# Patient Record
Sex: MALE | Race: WHITE | NOT HISPANIC OR LATINO
[De-identification: names, ages, dates, MRNs, and addresses within clinical notes are randomized per-mention and may not be internally consistent; named-entity substitution may affect disease eponyms.]

---

## 2019-06-22 ENCOUNTER — TRANSCRIPTION ENCOUNTER (OUTPATIENT)
Age: 69
End: 2019-06-22

## 2019-08-13 ENCOUNTER — TRANSCRIPTION ENCOUNTER (OUTPATIENT)
Age: 69
End: 2019-08-13

## 2019-10-27 ENCOUNTER — TRANSCRIPTION ENCOUNTER (OUTPATIENT)
Age: 69
End: 2019-10-27

## 2020-08-18 ENCOUNTER — NON-APPOINTMENT (OUTPATIENT)
Age: 70
End: 2020-08-18

## 2020-08-18 ENCOUNTER — APPOINTMENT (OUTPATIENT)
Dept: CARDIOLOGY | Facility: CLINIC | Age: 70
End: 2020-08-18
Payer: MEDICARE

## 2020-08-18 VITALS
WEIGHT: 175 LBS | OXYGEN SATURATION: 98 % | HEART RATE: 57 BPM | HEIGHT: 72 IN | DIASTOLIC BLOOD PRESSURE: 79 MMHG | BODY MASS INDEX: 23.7 KG/M2 | SYSTOLIC BLOOD PRESSURE: 137 MMHG

## 2020-08-18 DIAGNOSIS — I48.91 UNSPECIFIED ATRIAL FIBRILLATION: ICD-10-CM

## 2020-08-18 PROCEDURE — 99204 OFFICE O/P NEW MOD 45 MIN: CPT

## 2020-08-18 PROCEDURE — 93000 ELECTROCARDIOGRAM COMPLETE: CPT

## 2020-09-16 ENCOUNTER — APPOINTMENT (OUTPATIENT)
Dept: CARDIOLOGY | Facility: CLINIC | Age: 70
End: 2020-09-16
Payer: MEDICARE

## 2020-09-16 PROCEDURE — 93306 TTE W/DOPPLER COMPLETE: CPT

## 2020-10-17 ENCOUNTER — EMERGENCY (EMERGENCY)
Facility: HOSPITAL | Age: 70
LOS: 1 days | Discharge: HOME | End: 2020-10-17
Attending: EMERGENCY MEDICINE | Admitting: EMERGENCY MEDICINE
Payer: MEDICARE

## 2020-10-17 VITALS
TEMPERATURE: 98 F | SYSTOLIC BLOOD PRESSURE: 147 MMHG | DIASTOLIC BLOOD PRESSURE: 83 MMHG | HEART RATE: 63 BPM | RESPIRATION RATE: 18 BRPM | WEIGHT: 175.05 LBS | OXYGEN SATURATION: 100 %

## 2020-10-17 VITALS
TEMPERATURE: 98 F | DIASTOLIC BLOOD PRESSURE: 78 MMHG | SYSTOLIC BLOOD PRESSURE: 138 MMHG | HEART RATE: 60 BPM | OXYGEN SATURATION: 98 % | RESPIRATION RATE: 16 BRPM

## 2020-10-17 DIAGNOSIS — Z98.890 OTHER SPECIFIED POSTPROCEDURAL STATES: Chronic | ICD-10-CM

## 2020-10-17 LAB
ALBUMIN SERPL ELPH-MCNC: 3.6 G/DL — SIGNIFICANT CHANGE UP (ref 3.3–5)
ALP SERPL-CCNC: 59 U/L — SIGNIFICANT CHANGE UP (ref 40–120)
ALT FLD-CCNC: 24 U/L — SIGNIFICANT CHANGE UP (ref 12–78)
ANION GAP SERPL CALC-SCNC: 8 MMOL/L — SIGNIFICANT CHANGE UP (ref 5–17)
APPEARANCE UR: CLEAR — SIGNIFICANT CHANGE UP
APTT BLD: 26.5 SEC — LOW (ref 27.5–35.5)
AST SERPL-CCNC: 18 U/L — SIGNIFICANT CHANGE UP (ref 15–37)
BASOPHILS # BLD AUTO: 0.05 K/UL — SIGNIFICANT CHANGE UP (ref 0–0.2)
BASOPHILS NFR BLD AUTO: 0.5 % — SIGNIFICANT CHANGE UP (ref 0–2)
BILIRUB SERPL-MCNC: 1 MG/DL — SIGNIFICANT CHANGE UP (ref 0.2–1.2)
BILIRUB UR-MCNC: NEGATIVE — SIGNIFICANT CHANGE UP
BUN SERPL-MCNC: 18 MG/DL — SIGNIFICANT CHANGE UP (ref 7–23)
CALCIUM SERPL-MCNC: 8.6 MG/DL — SIGNIFICANT CHANGE UP (ref 8.5–10.1)
CHLORIDE SERPL-SCNC: 111 MMOL/L — HIGH (ref 96–108)
CO2 SERPL-SCNC: 26 MMOL/L — SIGNIFICANT CHANGE UP (ref 22–31)
COLOR SPEC: YELLOW — SIGNIFICANT CHANGE UP
CREAT SERPL-MCNC: 1.2 MG/DL — SIGNIFICANT CHANGE UP (ref 0.5–1.3)
DIFF PNL FLD: NEGATIVE — SIGNIFICANT CHANGE UP
EOSINOPHIL # BLD AUTO: 0.06 K/UL — SIGNIFICANT CHANGE UP (ref 0–0.5)
EOSINOPHIL NFR BLD AUTO: 0.6 % — SIGNIFICANT CHANGE UP (ref 0–6)
GLUCOSE SERPL-MCNC: 84 MG/DL — SIGNIFICANT CHANGE UP (ref 70–99)
GLUCOSE UR QL: NEGATIVE — SIGNIFICANT CHANGE UP
HCT VFR BLD CALC: 44.7 % — SIGNIFICANT CHANGE UP (ref 39–50)
HGB BLD-MCNC: 15.3 G/DL — SIGNIFICANT CHANGE UP (ref 13–17)
IMM GRANULOCYTES NFR BLD AUTO: 0.4 % — SIGNIFICANT CHANGE UP (ref 0–1.5)
INR BLD: 0.97 RATIO — SIGNIFICANT CHANGE UP (ref 0.88–1.16)
KETONES UR-MCNC: NEGATIVE — SIGNIFICANT CHANGE UP
LACTATE SERPL-SCNC: 0.8 MMOL/L — SIGNIFICANT CHANGE UP (ref 0.7–2)
LACTATE SERPL-SCNC: 3.2 MMOL/L — HIGH (ref 0.7–2)
LEUKOCYTE ESTERASE UR-ACNC: NEGATIVE — SIGNIFICANT CHANGE UP
LIDOCAIN IGE QN: 130 U/L — SIGNIFICANT CHANGE UP (ref 73–393)
LYMPHOCYTES # BLD AUTO: 1.25 K/UL — SIGNIFICANT CHANGE UP (ref 1–3.3)
LYMPHOCYTES # BLD AUTO: 13.5 % — SIGNIFICANT CHANGE UP (ref 13–44)
MCHC RBC-ENTMCNC: 29.8 PG — SIGNIFICANT CHANGE UP (ref 27–34)
MCHC RBC-ENTMCNC: 34.2 GM/DL — SIGNIFICANT CHANGE UP (ref 32–36)
MCV RBC AUTO: 87 FL — SIGNIFICANT CHANGE UP (ref 80–100)
MONOCYTES # BLD AUTO: 0.52 K/UL — SIGNIFICANT CHANGE UP (ref 0–0.9)
MONOCYTES NFR BLD AUTO: 5.6 % — SIGNIFICANT CHANGE UP (ref 2–14)
NEUTROPHILS # BLD AUTO: 7.32 K/UL — SIGNIFICANT CHANGE UP (ref 1.8–7.4)
NEUTROPHILS NFR BLD AUTO: 79.4 % — HIGH (ref 43–77)
NITRITE UR-MCNC: NEGATIVE — SIGNIFICANT CHANGE UP
NRBC # BLD: 0 /100 WBCS — SIGNIFICANT CHANGE UP (ref 0–0)
PH UR: 6 — SIGNIFICANT CHANGE UP (ref 5–8)
PLATELET # BLD AUTO: 216 K/UL — SIGNIFICANT CHANGE UP (ref 150–400)
POTASSIUM SERPL-MCNC: 4 MMOL/L — SIGNIFICANT CHANGE UP (ref 3.5–5.3)
POTASSIUM SERPL-SCNC: 4 MMOL/L — SIGNIFICANT CHANGE UP (ref 3.5–5.3)
PROT SERPL-MCNC: 7.4 G/DL — SIGNIFICANT CHANGE UP (ref 6–8.3)
PROT UR-MCNC: NEGATIVE — SIGNIFICANT CHANGE UP
PROTHROM AB SERPL-ACNC: 11.4 SEC — SIGNIFICANT CHANGE UP (ref 10.6–13.6)
RBC # BLD: 5.14 M/UL — SIGNIFICANT CHANGE UP (ref 4.2–5.8)
RBC # FLD: 13.2 % — SIGNIFICANT CHANGE UP (ref 10.3–14.5)
SODIUM SERPL-SCNC: 145 MMOL/L — SIGNIFICANT CHANGE UP (ref 135–145)
SP GR SPEC: 1.01 — SIGNIFICANT CHANGE UP (ref 1.01–1.02)
TROPONIN I SERPL-MCNC: <.015 NG/ML — SIGNIFICANT CHANGE UP (ref 0.01–0.04)
UROBILINOGEN FLD QL: NEGATIVE — SIGNIFICANT CHANGE UP
WBC # BLD: 9.24 K/UL — SIGNIFICANT CHANGE UP (ref 3.8–10.5)
WBC # FLD AUTO: 9.24 K/UL — SIGNIFICANT CHANGE UP (ref 3.8–10.5)

## 2020-10-17 PROCEDURE — 71045 X-RAY EXAM CHEST 1 VIEW: CPT | Mod: 26

## 2020-10-17 PROCEDURE — 85610 PROTHROMBIN TIME: CPT

## 2020-10-17 PROCEDURE — 85730 THROMBOPLASTIN TIME PARTIAL: CPT

## 2020-10-17 PROCEDURE — 71275 CT ANGIOGRAPHY CHEST: CPT | Mod: 26

## 2020-10-17 PROCEDURE — 83605 ASSAY OF LACTIC ACID: CPT

## 2020-10-17 PROCEDURE — 86900 BLOOD TYPING SEROLOGIC ABO: CPT

## 2020-10-17 PROCEDURE — 36415 COLL VENOUS BLD VENIPUNCTURE: CPT

## 2020-10-17 PROCEDURE — 81003 URINALYSIS AUTO W/O SCOPE: CPT

## 2020-10-17 PROCEDURE — 80053 COMPREHEN METABOLIC PANEL: CPT

## 2020-10-17 PROCEDURE — 84484 ASSAY OF TROPONIN QUANT: CPT

## 2020-10-17 PROCEDURE — 86850 RBC ANTIBODY SCREEN: CPT

## 2020-10-17 PROCEDURE — 83690 ASSAY OF LIPASE: CPT

## 2020-10-17 PROCEDURE — 99285 EMERGENCY DEPT VISIT HI MDM: CPT

## 2020-10-17 PROCEDURE — 96374 THER/PROPH/DIAG INJ IV PUSH: CPT | Mod: XU

## 2020-10-17 PROCEDURE — 96375 TX/PRO/DX INJ NEW DRUG ADDON: CPT

## 2020-10-17 PROCEDURE — 96361 HYDRATE IV INFUSION ADD-ON: CPT

## 2020-10-17 PROCEDURE — 93005 ELECTROCARDIOGRAM TRACING: CPT

## 2020-10-17 PROCEDURE — 74174 CTA ABD&PLVS W/CONTRAST: CPT

## 2020-10-17 PROCEDURE — 86901 BLOOD TYPING SEROLOGIC RH(D): CPT

## 2020-10-17 PROCEDURE — 93010 ELECTROCARDIOGRAM REPORT: CPT

## 2020-10-17 PROCEDURE — 71045 X-RAY EXAM CHEST 1 VIEW: CPT

## 2020-10-17 PROCEDURE — 71275 CT ANGIOGRAPHY CHEST: CPT

## 2020-10-17 PROCEDURE — 99285 EMERGENCY DEPT VISIT HI MDM: CPT | Mod: 25

## 2020-10-17 PROCEDURE — 74174 CTA ABD&PLVS W/CONTRAST: CPT | Mod: 26

## 2020-10-17 PROCEDURE — 99284 EMERGENCY DEPT VISIT MOD MDM: CPT

## 2020-10-17 PROCEDURE — 85025 COMPLETE CBC W/AUTO DIFF WBC: CPT

## 2020-10-17 RX ORDER — SODIUM CHLORIDE 9 MG/ML
300 INJECTION INTRAMUSCULAR; INTRAVENOUS; SUBCUTANEOUS ONCE
Refills: 0 | Status: COMPLETED | OUTPATIENT
Start: 2020-10-17 | End: 2020-10-17

## 2020-10-17 RX ORDER — PANTOPRAZOLE SODIUM 20 MG/1
40 TABLET, DELAYED RELEASE ORAL ONCE
Refills: 0 | Status: COMPLETED | OUTPATIENT
Start: 2020-10-17 | End: 2020-10-17

## 2020-10-17 RX ORDER — SODIUM CHLORIDE 9 MG/ML
1000 INJECTION INTRAMUSCULAR; INTRAVENOUS; SUBCUTANEOUS ONCE
Refills: 0 | Status: COMPLETED | OUTPATIENT
Start: 2020-10-17 | End: 2020-10-17

## 2020-10-17 RX ORDER — ONDANSETRON 8 MG/1
4 TABLET, FILM COATED ORAL ONCE
Refills: 0 | Status: COMPLETED | OUTPATIENT
Start: 2020-10-17 | End: 2020-10-17

## 2020-10-17 RX ORDER — MORPHINE SULFATE 50 MG/1
4 CAPSULE, EXTENDED RELEASE ORAL ONCE
Refills: 0 | Status: DISCONTINUED | OUTPATIENT
Start: 2020-10-17 | End: 2020-10-17

## 2020-10-17 RX ADMIN — SODIUM CHLORIDE 300 MILLILITER(S): 9 INJECTION INTRAMUSCULAR; INTRAVENOUS; SUBCUTANEOUS at 13:30

## 2020-10-17 RX ADMIN — SODIUM CHLORIDE 1000 MILLILITER(S): 9 INJECTION INTRAMUSCULAR; INTRAVENOUS; SUBCUTANEOUS at 11:00

## 2020-10-17 RX ADMIN — SODIUM CHLORIDE 1000 MILLILITER(S): 9 INJECTION INTRAMUSCULAR; INTRAVENOUS; SUBCUTANEOUS at 12:30

## 2020-10-17 RX ADMIN — SODIUM CHLORIDE 300 MILLILITER(S): 9 INJECTION INTRAMUSCULAR; INTRAVENOUS; SUBCUTANEOUS at 12:33

## 2020-10-17 RX ADMIN — PANTOPRAZOLE SODIUM 40 MILLIGRAM(S): 20 TABLET, DELAYED RELEASE ORAL at 10:16

## 2020-10-17 RX ADMIN — MORPHINE SULFATE 4 MILLIGRAM(S): 50 CAPSULE, EXTENDED RELEASE ORAL at 10:36

## 2020-10-17 RX ADMIN — ONDANSETRON 4 MILLIGRAM(S): 8 TABLET, FILM COATED ORAL at 10:16

## 2020-10-17 RX ADMIN — MORPHINE SULFATE 4 MILLIGRAM(S): 50 CAPSULE, EXTENDED RELEASE ORAL at 10:16

## 2020-10-17 RX ADMIN — SODIUM CHLORIDE 1000 MILLILITER(S): 9 INJECTION INTRAMUSCULAR; INTRAVENOUS; SUBCUTANEOUS at 11:31

## 2020-10-17 RX ADMIN — SODIUM CHLORIDE 1000 MILLILITER(S): 9 INJECTION INTRAMUSCULAR; INTRAVENOUS; SUBCUTANEOUS at 10:00

## 2020-10-17 NOTE — CONSULT NOTE ADULT - ASSESSMENT
Assessment/Plan:  70 y/o M with transient Afib (not on AC) and left inguinal hernia presented to the ED c/o left groin pain which felt to him as if his inguinal hernia mesh "popped" this morning.  His pain was associated with nausea but no vomiting, lightheadedness.  States, his pain was radiating across his abdomen, chest, and back.  Denies SOB, [place], palpitations.  Denies fever, chills, sick contact , and syncope.    In the ED, he had a reduction of his left inguinal hernia and he has been asymptomatic.  His EKG showed NSR with no ischemic changes.  His CTA abdomen/pelvis negative of any acute abnormality.  However, CTA chest showed B/L lung nodules.  Troponin in ED negative.    Atypical Chest Pain  - His pain is not cardiac but a referred pain from his inguinal hernia s/p reduction with resolution of symptoms  - EKG showed NSR with no evidence of ischemia  - Troponin x 1 negative.  No need to trend  - CTA abdomen/pelvis/chest noted  - Awaiting surgical eval    Paroxysmal Afib  - Was diagnosed by his PCP and was referred to Dr. Cruz.    - TTE in 9/2020 showed normal EF with no valvular disease  - [id number] = 1, continue  mg for now as his home dose  - Continue home BB  - EKG showed NSR  - Monitor electrolytes, replete to keep K>4 and Mag>2    If no further w/u or surgical intervention needed as inpatient, can be discharged from cardiac standpoint  Otherwise, will follow as inpatient    Duyen Laird DNP, ANP-C  Cardiology  Spectra #[phone]        
All as above in PA notation.  Patient personally seen and examined.  Vitals non-suggestive.  Abdomen full/ obese, but soft and non tense and non tender.  RUQ completely WNL without guarding or rebound or referred pain.  Left groin with small to moderately sized but soft and easily reducible and seemingly fat containing recurrent inguinal hernia.  Labs reviewed and generally reassuring.  CT scan report and images personally reviewed.    Overall, clinical presentation and course most consistent with symptomatic and recurrent but non incarcerated/ non strangulated left inguinal hernia.  Patient clinically improved since arrival.  Patient surgically stable at present.  Already provided copies of results including CT to facilitate follow-up with PMD in regards to the pulmonary nodules.  Importance of this emphasized to patient and spouse in detail.  To follow-up with me in office to further discuss risks, benefits, nature of hernia repair.  No surgical contraindication to discharge.  Patient pleased, wife agrees, they are prepared to leave in good spirits.

## 2020-10-17 NOTE — ED PROVIDER NOTE - PATIENT PORTAL LINK FT
You can access the FollowMyHealth Patient Portal offered by [facility] by registering at the following website: [email or web address]. By joining [email or web address]’s FollowMyHealth portal, you will also be able to view your health information using other applications (apps) compatible with our system.

## 2020-10-17 NOTE — ED PROVIDER NOTE - CLINICAL SUMMARY MEDICAL DECISION MAKING FREE TEXT BOX
pt is a 68yo male with pmhx of hld not treated, A-fib on 81mg asa, s/p inguinal hernia repair (aprox 20 years ago) presents with abd pain x 1.5 hours. pt reports diffuse upper abd pain radiating to chest and lower abd with associated nausea without vomiting. pt had normal bms no blood noted. pt reports hernia mesh stretched causing reoccurrence of inguinal hernia. pt reports pain around hernia site. pt has been taking advil the past few days for a toothache.pt with diffuse abd pain radating to chest and back concern for ulcer/pancreaitis vs dissection. will do labs, cta chest/abd/pelvis, lactate to r/o ischemic bowel, trop, ua, ekg, cxr, ivf, pain control, pepcid, zofran and re-eval. cardio consulted will see pt in ed.

## 2020-10-17 NOTE — ED PROVIDER NOTE - CARE PROVIDER_API CALL
Malnutrition Notification Event Note Event Note Tai Baez)  Internal Medicine  [address] [place] [address]  Phone: [phone]  Fax: [phone]  Follow Up Time: 4-6 Days   Tai Baez)  Internal Medicine  [address] [place] [address]  Phone: [phone]  Fax: [phone]  Follow Up Time: 4-6 Days    Gilles Mccoy)  Internal Medicine  [address] [place] [id number]  Phone: [phone]  Fax: [phone]  Follow Up Time: 4-6 Days    Tuan Booth  SURGERY  [address] [place] [id number]  Phone: [phone]  Fax: [phone]  Follow Up Time: 1-3 Days

## 2020-10-17 NOTE — ED PROVIDER NOTE - ATTENDING CONTRIBUTION TO CARE
pt c/o 1.5 hrs of epigastric abd pain radiating to chest and shoulder and also left inguinal pain near site prior hernia repair. no fevers, chills, n/v, cough, diarrhea, urinary complaints.  wd, wn male, mild distress, pink conjunctiva, s1s2 rrr, normal pulses, lungs cta, abd soft, nontender ruq, epigastric, luq, rlq llq, + tender left inguinal hernia, reducible, no cvat

## 2020-10-17 NOTE — ED PROVIDER NOTE - CARE PLAN
Principal Discharge DX:	Abdominal pain  Secondary Diagnosis:	Chest pain   Principal Discharge DX:	Abdominal pain  Secondary Diagnosis:	Chest pain  Secondary Diagnosis:	Lung nodule

## 2020-10-17 NOTE — ED PROVIDER NOTE - PROVIDER TOKENS
PROVIDER:[TOKEN:[[id number]],FOLLOWUP:[4-6 Days]] PROVIDER:[TOKEN:[[id number]],FOLLOWUP:[4-6 Days]],PROVIDER:[TOKEN:[[id number]],FOLLOWUP:[4-6 Days]],PROVIDER:[TOKEN:[[id number]],FOLLOWUP:[1-3 Days]]

## 2020-10-17 NOTE — ED ADULT NURSE NOTE - NSIMPLEMENTINTERV_GEN_ALL_ED
Implemented All Universal Safety Interventions:  [place] to call system. Call bell, personal items and telephone within reach. Instruct patient to call for assistance. Room bathroom lighting operational. Non-slip footwear when patient is off stretcher. Physically safe environment: no spills, clutter or unnecessary equipment. Stretcher in lowest position, wheels locked, appropriate side rails in place.

## 2020-10-17 NOTE — CONSULT NOTE ADULT - SUBJECTIVE AND OBJECTIVE BOX
SURGERY PA CONSULT NOTE:    CHIEF COMPLAINT:  Patient is a 69y old  Male who presents with a chief complaint of left inguinal pain since this morning     PCP:   Dr. Gilles Mccoy   CARDIOLOGIST:  Dr. Cruz     HPI:  This is a pleasant, 70 yo male with pmhx of Afib, HLD, and melanoma (s/p excision), who presented to the ED earlier today with complaints of sudden onset pain in the left groin, with associated nausea.  He reports the pain has essentially resolved at this time, but has residual soreness in the left groin.  At onset approx. 0815 hours, pain was sharp, severe, constant, radiated to the upper abdomen and chest, and rated 10/10, causing him to double over.  He reports eating breakfast (cereal with fruit) about 15 minutes prior to pain onset.  Nausea began about an hour after the pain, and was associated with an episode of retching without andrew vomiting.  Patient has h/o open left IH repair with mesh about 30y ago (with Dr. Rico in [place]) - always had a slight bulge post-operatively, but was told it was normal stretching of the mesh - never had any significant complications or pain with regard to the hernia until this morning.  Denies fevers, chills, sweats, SOB, dyspnea, dizziness, palpitations.  Denies any change in bowel or bladder habits - last BM this morning (normal, non-bloody), +passing flatus, + urinating.  Denies recent sick contacts, did return from [place] yesterday.      PAST MEDICAL HISTORY:  Afib  Hyperlipidemia    PAST SURGICAL HISTORY:  Prior left inguinal hernia repair with mesh  Melanoma excision from abdomen  Hemorrhoid surgery   Left shoulder RCR  Right ring finger ORIF  Vein excision left leg    REVIEW OF SYSTEMS:  General: No acute distress, awake and alert  Head: Normal cephalic/atraumatic  Neck: Denies neck pain or palpable masses, hoarseness or stridor  Lymphatic: Denies cervical or axillary or femoral lymphadenopathy  Chest: No chest pain, cough or hemoptysis  Heart: No chest pain, palpitations  Abdomen: No abdominal distention or vomiting, presented with left groin pain (since resolved)  Extremities: Denies cyanosis, open sores or swollen extremity  Neuro: Denies weakness, paralysis, syncope, loss of vision  Psych: Denies hallucinations, visual disturbances, or depression    MEDICATIONS:  81mg ASA (2 pills QAM)  Centrum MVI    ALLERGIES:  penicillin (Hives)    SOCIAL HISTORY:  Former smoker (2/3 ppd x 25y), quit 20y ago  Occasional ETOH (wine)  Denies illicit drugs   with 3 grown children       FAMILY HISTORY:  Non-contributory    VITALS:  T(C): 36.7 (17 Oct 2020 12:22), Max: 36.7 (17 Oct 2020 12:22)  T(F): 98 (17 Oct 2020 12:22), Max: 98 (17 Oct 2020 12:22)  HR: 62 (17 Oct 2020 12:22) (62 - 63)  BP: 142/80 (17 Oct 2020 12:22) (140/78 - 147/83)  BP(mean): --  RR: 16 (17 Oct 2020 12:22) (16 - 18)  SpO2: 99% (17 Oct 2020 12:22) (99% - 100%)    PHYSICAL EXAM:  General:  Appears stated age, well-groomed, well-nourished, no distress  HENT:  WNL, no JVD  Chest:  Clear to auscultation bilaterally without W/R/R  Cardiovascular:  Regular rate & rhythm, S1S2  Abdomen:  Soft, NT, ND.  No rebound/guarding.  +BS x 4.  No appreciable deformities/masses/hernias in the groin/inguinal region bilaterally.  No appreciable LAD bilat inguinal region.  Old, healed herniorrhaphy incision left groin noted.  Old, healed incision from melanoma excision noted at mid-abdomen.    Extremities:  Calves soft, NT bilat without edema  Skin:  No rash  Musculoskeletal:  Normal strength  Neuro/Psych:  Alert, oriented to time, place, and person     LABS:                        15.3   9.24  )-----------( 216      ( 17 Oct 2020 10:25 )             44.7     10-17    145  |  111<H>  |  18  ----------------------------<  84  4.0   |  26  |  1.20    Ca    8.6      17 Oct 2020 10:25    TPro  7.4  /  Alb  3.6  /  TBili  1.0  /  DBili  x   /  AST  18  /  ALT  24  /  AlkPhos  59  10-17    PT/INR - ( 17 Oct 2020 10:25 )   PT: 11.4 sec;   INR: 0.97 ratio    PTT - ( 17 Oct 2020 10:25 )  PTT:26.5 sec    Urinalysis Basic - ( 17 Oct 2020 11:45 )  Color: Yellow / Appearance: Clear / S.010 / pH: x  Gluc: x / Ketone: Negative  / Bili: Negative / Urobili: Negative   Blood: x / Protein: Negative / Nitrite: Negative   Leuk Esterase: Negative / RBC: x / WBC x   Sq Epi: x / Non Sq Epi: x / Bacteria: x    RADIOLOGY & ADDITIONAL STUDIES:    EXAM:  CT ANGIO ABD PELV (W)AW IC                        EXAM:  CT ANGIO CHEST (W)AW IC                        PROCEDURE DATE:  10/17/2020    INTERPRETATION:  Reason for Exam:  Chest pain  CTA of the chest, abdomen and pelvis was performed from the thoracic inlet to the level of the pubic symphysis following IV contrast injection of Omnipaque 350. No immediate complications were reported.  3D MIP images were also obtained.  Comparison: Chest x-ray of same date  Findings:  Vascular: There is no dissection flap identified. The aorta is normal in size. The celiac axis, SMA, bilateral renal arteries and VERONICA are patent. The bilateral common iliac arteries and their branches are patent. Atherosclerotic disease noted within the aorta  Chest:  Thyroid gland is unremarkable. Pulmonary arteries are normal in size. No lymphadenopathy. No pericardial effusion. No consolidations, edema, effusion, or pneumothorax.  Right upper lobe 1.5 cm nodule is noted. A left upper lobe 0.4 cm nodule is noted on image 46. Left upper lobe 0.8 cm nodule on image 60 is noted.  Abdomen and Pelvis: The liver, spleen, pancreas and adrenals are unremarkable. The gallbladder is present and contains a gallstone without inflammatory changes. Nonobstructing stone in the right kidney measures 3 mm on series 2 image 128. There is no retroperitoneal lymphadenopathy.  No evidence of bowel obstruction. Colonic diverticulosis without diverticulitis. The appendix is normal. The pelvic organs are unremarkable.  No acute bony abnormality.  Impression: No aortic dissection  Bilateral pulmonary nodules measuring up to 1.5 cm which are indeterminate. Differential includes metastatic disease as well as primary malignancy as well as benigncauses. If there are prior, outside CT exams of the chest they should be submitted for comparison. If none are available consider follow-up in 1 month or alternatively PET/CT examination.  DADA SOW M.D., ATTENDING RADIOLOGIST  This document has been electronically signed. Oct 17 2020 12:16PM    EXAM:  XR CHEST PORTABLE URGENT 1V                        PROCEDURE DATE:  10/17/2020    INTERPRETATION:  Chest pain.  AP chest.  Heart and mediastinum normal.  Lungs clear.  Costophrenic angles sharp bilaterally.  IMPRESSION: Normal chest  CARLA BROWN M.D., ATTENDING RADIOLOGIST  This document has been electronically signed. Oct 17 2020 11:21AM    ASSESSMENT:  70yo male with h/o afib, HLD presents with sudden onset severe left groin pain, now resolved  +/- recurrent inguinal hernia     PLAN:  Discussed with Dr. Booth (also will see patient today).  Patient appears to have had a recurrence of his left inguinal hernia, which is apparently reducible.  He is currently asymptomatic, with non-suggestive VS, no appreciable ischemia or infectious process, and no electrolyte or other significant laboratory abnormalities (with the exception of a somewhat elevated lactate).  CT scan not c/w any acute surgical pathology - as such, admission would be unnecessary at this time.  However, with regard to the pulmonary nodules elucidated on CT scan, recommendation for outpatient follow-up with Dr. Mccoy for further eval/work-up and medical decision making was made.  Also recommend follow-up with Dr. Booth in his office for further eval/discussion of the left inguinal hernia (possibility of revision herniorrhaphy was discussed). SURGERY PA CONSULT NOTE:    CHIEF COMPLAINT:  Patient is a 69y old  Male who presents with a chief complaint of left inguinal pain since this morning     PCP:   Dr. Gilles Mccoy   CARDIOLOGIST:  Dr. Cruz     HPI:  This is a pleasant, 68 yo male with pmhx of Afib, HLD, and melanoma (s/p excision), who presented to the ED earlier today with complaints of sudden onset pain in the left groin, with associated nausea.  He reports the pain has essentially resolved at this time, but has residual soreness in the left groin.  At onset approx. 0815 hours, pain was sharp, severe, constant, radiated to the upper abdomen and chest, and rated 10/10, causing him to double over.  He reports eating breakfast (cereal with fruit) about 15 minutes prior to pain onset.  Nausea began about an hour after the pain, and was associated with an episode of retching without andrew vomiting.  Patient has h/o open left IH repair with mesh about 30y ago (with Dr. Rico in [place]) - always had a slight bulge post-operatively, but was told it was normal stretching of the mesh - never had any significant complications or pain with regard to the hernia until this morning.  Denies fevers, chills, sweats, SOB, dyspnea, dizziness, palpitations.  Denies any change in bowel or bladder habits - last BM this morning (normal, non-bloody), +passing flatus, + urinating.  Denies recent sick contacts, did return from [place] yesterday.      PAST MEDICAL HISTORY:  Afib  Hyperlipidemia    PAST SURGICAL HISTORY:  Prior left inguinal hernia repair with mesh  Melanoma excision from abdomen  Hemorrhoid surgery   Left shoulder RCR  Right ring finger ORIF  Vein excision left leg    REVIEW OF SYSTEMS:  General: No acute distress, awake and alert  Head: Normal cephalic/atraumatic  Neck: Denies neck pain or palpable masses, hoarseness or stridor  Lymphatic: Denies cervical or axillary or femoral lymphadenopathy  Chest: No chest pain, cough or hemoptysis  Heart: No chest pain, palpitations  Abdomen: No abdominal distention or vomiting, presented with left groin pain (since resolved)  Extremities: Denies cyanosis, open sores or swollen extremity  Neuro: Denies weakness, paralysis, syncope, loss of vision  Psych: Denies hallucinations, visual disturbances, or depression    MEDICATIONS:  81mg ASA (2 pills QAM)  Centrum MVI    ALLERGIES:  penicillin (Hives)    SOCIAL HISTORY:  Former smoker (2/3 ppd x 25y), quit 20y ago  Occasional ETOH (wine)  Denies illicit drugs   with 3 grown children       FAMILY HISTORY:  Non-contributory    VITALS:  T(C): 36.7 (17 Oct 2020 12:22), Max: 36.7 (17 Oct 2020 12:22)  T(F): 98 (17 Oct 2020 12:22), Max: 98 (17 Oct 2020 12:22)  HR: 62 (17 Oct 2020 12:22) (62 - 63)  BP: 142/80 (17 Oct 2020 12:22) (140/78 - 147/83)  BP(mean): --  RR: 16 (17 Oct 2020 12:22) (16 - 18)  SpO2: 99% (17 Oct 2020 12:22) (99% - 100%)    PHYSICAL EXAM:  General:  Appears stated age, well-groomed, well-nourished, no distress  HENT:  WNL, no JVD  Chest:  Clear to auscultation bilaterally without W/R/R  Cardiovascular:  Regular rate & rhythm, S1S2  Abdomen:  Soft, NT, ND.  No rebound/guarding.  +BS x 4.  No appreciable deformities/masses/hernias in the groin/inguinal region bilaterally.  No appreciable LAD bilat inguinal region.  Old, healed herniorrhaphy incision left groin noted.  Old, healed incision from melanoma excision noted at mid-abdomen.    Extremities:  Calves soft, NT bilat without edema  Skin:  No rash  Musculoskeletal:  Normal strength  Neuro/Psych:  Alert, oriented to time, place, and person     LABS:                        15.3   9.24  )-----------( 216      ( 17 Oct 2020 10:25 )             44.7     10-17    145  |  111<H>  |  18  ----------------------------<  84  4.0   |  26  |  1.20    Ca    8.6      17 Oct 2020 10:25    TPro  7.4  /  Alb  3.6  /  TBili  1.0  /  DBili  x   /  AST  18  /  ALT  24  /  AlkPhos  59  10-17    PT/INR - ( 17 Oct 2020 10:25 )   PT: 11.4 sec;   INR: 0.97 ratio    PTT - ( 17 Oct 2020 10:25 )  PTT:26.5 sec    Urinalysis Basic - ( 17 Oct 2020 11:45 )  Color: Yellow / Appearance: Clear / S.010 / pH: x  Gluc: x / Ketone: Negative  / Bili: Negative / Urobili: Negative   Blood: x / Protein: Negative / Nitrite: Negative   Leuk Esterase: Negative / RBC: x / WBC x   Sq Epi: x / Non Sq Epi: x / Bacteria: x    RADIOLOGY & ADDITIONAL STUDIES:    EXAM:  CT ANGIO ABD PELV (W)AW IC                        EXAM:  CT ANGIO CHEST (W)AW IC                        PROCEDURE DATE:  10/17/2020    INTERPRETATION:  Reason for Exam:  Chest pain  CTA of the chest, abdomen and pelvis was performed from the thoracic inlet to the level of the pubic symphysis following IV contrast injection of Omnipaque 350. No immediate complications were reported.  3D MIP images were also obtained.  Comparison: Chest x-ray of same date  Findings:  Vascular: There is no dissection flap identified. The aorta is normal in size. The celiac axis, SMA, bilateral renal arteries and VERONICA are patent. The bilateral common iliac arteries and their branches are patent. Atherosclerotic disease noted within the aorta  Chest:  Thyroid gland is unremarkable. Pulmonary arteries are normal in size. No lymphadenopathy. No pericardial effusion. No consolidations, edema, effusion, or pneumothorax.  Right upper lobe 1.5 cm nodule is noted. A left upper lobe 0.4 cm nodule is noted on image 46. Left upper lobe 0.8 cm nodule on image 60 is noted.  Abdomen and Pelvis: The liver, spleen, pancreas and adrenals are unremarkable. The gallbladder is present and contains a gallstone without inflammatory changes. Nonobstructing stone in the right kidney measures 3 mm on series 2 image 128. There is no retroperitoneal lymphadenopathy.  No evidence of bowel obstruction. Colonic diverticulosis without diverticulitis. The appendix is normal. The pelvic organs are unremarkable.  No acute bony abnormality.  Impression: No aortic dissection  Bilateral pulmonary nodules measuring up to 1.5 cm which are indeterminate. Differential includes metastatic disease as well as primary malignancy as well as benigncauses. If there are prior, outside CT exams of the chest they should be submitted for comparison. If none are available consider follow-up in 1 month or alternatively PET/CT examination.  DADA SOW M.D., ATTENDING RADIOLOGIST  This document has been electronically signed. Oct 17 2020 12:16PM    EXAM:  XR CHEST PORTABLE URGENT 1V                        PROCEDURE DATE:  10/17/2020    INTERPRETATION:  Chest pain.  AP chest.  Heart and mediastinum normal.  Lungs clear.  Costophrenic angles sharp bilaterally.  IMPRESSION: Normal chest  CARLA BROWN M.D., ATTENDING RADIOLOGIST  This document has been electronically signed. Oct 17 2020 11:21AM    ASSESSMENT:  68yo male with h/o afib, HLD presents with sudden onset severe left groin pain, now resolved  +/- recurrent inguinal hernia     PLAN:  Discussed with Dr. Booth (also will see patient today).  Patient appears to have had a recurrence of his left inguinal hernia, which is apparently reducible.  He is currently asymptomatic, with non-suggestive VS, no appreciable ischemia or infectious process, and no electrolyte or other significant laboratory abnormalities (with the exception of a somewhat elevated lactate).  CT scan not c/w any acute surgical pathology - as such, admission from a surgical standpoint would be unnecessary at this time.  However, with regard to the pulmonary nodules elucidated on CT scan, recommendation for outpatient follow-up with Dr. Mccoy for further eval/work-up and medical decision making was made.  Also recommend outpatient follow-up with Dr. Booth in his office for further eval/discussion of the left inguinal hernia (possibility of revision herniorrhaphy was discussed). SURGERY PA CONSULT NOTE:      CHIEF COMPLAINT:  Patient is a 69y old  Male who presents with a chief complaint of left inguinal pain since this morning       PCP:   Dr. Gilles Mccoy       CARDIOLOGIST:  Dr. Cruz       HPI:  This is a pleasant, 68 yo male with pmhx of Afib, HLD, and melanoma (s/p excision), who presented to the ED earlier today with complaints of sudden onset pain in the left groin, with associated nausea.  He reports the pain has essentially resolved at this time, but has residual soreness in the left groin.  At onset approx. 0815 hours, pain was sharp, severe, constant, radiated to the upper abdomen and chest, and rated 10/10, causing him to double over.  He reports eating breakfast (cereal with fruit) about 15 minutes prior to pain onset.  Nausea began about an hour after the pain, and was associated with an episode of retching without andrew vomiting.  Patient has h/o open left IH repair with mesh about 30y ago (with Dr. Rico in [place]) - always had a slight bulge post-operatively, but was told it was normal stretching of the mesh - never had any significant complications or pain with regard to the hernia until this morning.  Denies fevers, chills, sweats, SOB, dyspnea, dizziness, palpitations.  Denies any change in bowel or bladder habits - last BM this morning (normal, non-bloody), +passing flatus, + urinating.  Denies recent sick contacts, did return from [place] yesterday.        PAST MEDICAL HISTORY:  Afib  Hyperlipidemia      PAST SURGICAL HISTORY:  Prior left inguinal hernia repair with mesh  Melanoma excision from abdomen  Hemorrhoid surgery   Left shoulder RCR  Right ring finger ORIF  Vein excision left leg      REVIEW OF SYSTEMS:  General: No acute distress, awake and alert  Head: Normal cephalic/atraumatic  Neck: Denies neck pain or palpable masses, hoarseness or stridor  Lymphatic: Denies cervical or axillary or femoral lymphadenopathy  Chest: No chest pain, cough or hemoptysis  Heart: No chest pain, palpitations  Abdomen: No abdominal distention or vomiting, presented with left groin pain (since resolved)  Extremities: Denies cyanosis, open sores or swollen extremity  Neuro: Denies weakness, paralysis, syncope, loss of vision  Psych: Denies hallucinations, visual disturbances, or depression      MEDICATIONS:  81mg ASA (2 pills QAM)  Centrum MVI      ALLERGIES:  penicillin (Hives)      SOCIAL HISTORY:  Former smoker (2/3 ppd x 25y), quit 20y ago  Occasional ETOH (wine)  Denies illicit drugs   with 3 grown children         FAMILY HISTORY:  Non-contributory      VITALS:  T(F): 98 (17 Oct 2020 12:22), Max: 98 (17 Oct 2020 12:22)  HR: 62 (17 Oct 2020 12:22) (62 - 63)  BP: 142/80 (17 Oct 2020 12:22) (140/78 - 147/83)  RR: 16 (17 Oct 2020 12:22) (16 - 18)  SpO2: 99% (17 Oct 2020 12:22) (99% - 100%)      PHYSICAL EXAM:  General:  Appears stated age, well-groomed, well-nourished, no distress  HENT:  WNL, no JVD  Chest:  Clear to auscultation bilaterally without W/R/R  Cardiovascular:  Regular rate & rhythm, S1S2  Abdomen:  Soft, NT, ND.  No rebound/guarding.  +BS x 4.  No appreciable deformities/masses/hernias in the groin/inguinal region bilaterally.  No appreciable LAD bilat inguinal region.  Old, healed herniorrhaphy incision left groin noted.  Old, healed incision from melanoma excision noted at mid-abdomen.    Extremities:  Calves soft, NT bilat without edema  Skin:  No rash  Musculoskeletal:  Normal strength  Neuro/Psych:  Alert, oriented to time, place, and person       LABS:                        15.3   9.24  )-----------( 216      ( 17 Oct 2020 10:25 )             44.7     10-17    145  |  111<H>  |  18  ----------------------------<  84  4.0   |  26  |  1.20    Ca    8.6      17 Oct 2020 10:25    TPro  7.4  /  Alb  3.6  /  TBili  1.0  /  DBili  x   /  AST  18  /  ALT  24  /  AlkPhos  59  10-17    PT/INR - ( 17 Oct 2020 10:25 )   PT: 11.4 sec;   INR: 0.97 ratio    PTT - ( 17 Oct 2020 10:25 )  PTT:26.5 sec    Urinalysis Basic - ( 17 Oct 2020 11:45 )  Color: Yellow / Appearance: Clear / S.010 / pH: x  Gluc: x / Ketone: Negative  / Bili: Negative / Urobili: Negative   Blood: x / Protein: Negative / Nitrite: Negative   Leuk Esterase: Negative / RBC: x / WBC x   Sq Epi: x / Non Sq Epi: x / Bacteria: x      RADIOLOGY & ADDITIONAL STUDIES:    EXAM:  CT ANGIO ABD PELV (W)AW IC                        EXAM:  CT ANGIO CHEST (W)AW IC                        PROCEDURE DATE:  10/17/2020    INTERPRETATION:  Reason for Exam:  Chest pain  CTA of the chest, abdomen and pelvis was performed from the thoracic inlet to the level of the pubic symphysis following IV contrast injection of Omnipaque 350. No immediate complications were reported.  3D MIP images were also obtained.  Comparison: Chest x-ray of same date  Findings:  Vascular: There is no dissection flap identified. The aorta is normal in size. The celiac axis, SMA, bilateral renal arteries and VERONICA are patent. The bilateral common iliac arteries and their branches are patent. Atherosclerotic disease noted within the aorta  Chest:  Thyroid gland is unremarkable. Pulmonary arteries are normal in size. No lymphadenopathy. No pericardial effusion. No consolidations, edema, effusion, or pneumothorax.  Right upper lobe 1.5 cm nodule is noted. A left upper lobe 0.4 cm nodule is noted on image 46. Left upper lobe 0.8 cm nodule on image 60 is noted.  Abdomen and Pelvis: The liver, spleen, pancreas and adrenals are unremarkable. The gallbladder is present and contains a gallstone without inflammatory changes. Nonobstructing stone in the right kidney measures 3 mm on series 2 image 128. There is no retroperitoneal lymphadenopathy.  No evidence of bowel obstruction. Colonic diverticulosis without diverticulitis. The appendix is normal. The pelvic organs are unremarkable.  No acute bony abnormality.  Impression: No aortic dissection  Bilateral pulmonary nodules measuring up to 1.5 cm which are indeterminate. Differential includes metastatic disease as well as primary malignancy as well as benigncauses. If there are prior, outside CT exams of the chest they should be submitted for comparison. If none are available consider follow-up in 1 month or alternatively PET/CT examination.  DADA SOW M.D., ATTENDING RADIOLOGIST  This document has been electronically signed. Oct 17 2020 12:16PM    EXAM:  XR CHEST PORTABLE URGENT 1V                        PROCEDURE DATE:  10/17/2020    INTERPRETATION:  Chest pain.  AP chest.  Heart and mediastinum normal.  Lungs clear.  Costophrenic angles sharp bilaterally.  IMPRESSION: Normal chest  CARLA KEVIN M.D., ATTENDING RADIOLOGIST  This document has been electronically signed. Oct 17 2020 11:21AM      ASSESSMENT:  68yo male with h/o afib, HLD presents with sudden onset severe left groin pain, now resolved  +/- recurrent inguinal hernia       PLAN:  Discussed with Dr. Booth (also will see patient today).  Patient appears to have had a recurrence of his left inguinal hernia, which is apparently reducible.  He is currently asymptomatic, with non-suggestive VS, no appreciable ischemia or infectious process, and no electrolyte or other significant laboratory abnormalities (with the exception of a somewhat elevated lactate).  CT scan not c/w any acute surgical pathology - as such, admission from a surgical standpoint would be unnecessary at this time.  However, with regard to the pulmonary nodules elucidated on CT scan, recommendation for outpatient follow-up with Dr. Mccoy for further eval/work-up and medical decision making was made.  Also recommend outpatient follow-up with Dr. Booth in his office for further eval/discussion of the left inguinal hernia (possibility of revision herniorrhaphy was discussed).

## 2020-10-17 NOTE — CONSULT NOTE ADULT - SUBJECTIVE AND OBJECTIVE BOX
[facility] - María Kebede, Ana Cruz, Nestor, Azael, Ashley Porras  Office Number: [phone]    Initial Consult Note    CHIEF COMPLAINT: Patient is a 69y old  Male who presents with a chief complaint of     HPI:    PAST MEDICAL & SURGICAL HISTORY:  Afib    Hernia  inguinal    Hyperlipidemia    H/O hernia repair    SOCIAL HISTORY:  No tobacco, ethanol, or drug abuse.  FAMILY HISTORY:    No family history of acute MI or sudden cardiac death.  MEDICATIONS  (STANDING):    MEDICATIONS  (PRN):    Allergies    penicillin (Hives)    Intolerances    REVIEW OF SYSTEMS:  CONSTITUTIONAL: No weakness, fevers or chills  EYES/ENT: No visual changes;  No vertigo or throat pain   NECK: No pain or stiffness  RESPIRATORY: No cough, wheezing, hemoptysis; No shortness of breath  CARDIOVASCULAR: No chest pain or palpitations  GASTROINTESTINAL: No abdominal pain. No nausea, vomiting, or hematemesis; No diarrhea or constipation. No melena or hematochezia.  GENITOURINARY: No dysuria, frequency or hematuria  NEUROLOGICAL: No numbness or weakness  SKIN: No itching or rash  All other review of systems is negative unless indicated above  VITAL SIGNS:   Vital Signs Last 24 Hrs  T(C): 36.7 (17 Oct 2020 12:22), Max: 36.7 (17 Oct 2020 12:22)  T(F): 98 (17 Oct 2020 12:22), Max: 98 (17 Oct 2020 12:22)  HR: 62 (17 Oct 2020 12:22) (62 - 63)  BP: 142/80 (17 Oct 2020 12:22) (140/78 - 147/83)  BP(mean): --  RR: 16 (17 Oct 2020 12:22) (16 - 18)  SpO2: 99% (17 Oct 2020 12:22) (99% - 100%)  I&O's Summary    On Exam:  Constitutional: NAD, alert and oriented x 3  Lungs:  Non-labored, breath sounds are clear bilaterally, No wheezing, rales or rhonchi  Cardiovascular: RRR.  S1 and S2 positive.  No murmurs, rubs, gallops or clicks  Gastrointestinal: Bowel Sounds present, soft, nontender.   Lymph: No peripheral edema. No cervical lymphadenopathy.  Neurological: Alert, no focal deficits  Skin: No rashes or ulcers   Psych:  Mood & affect appropriate.    LABS: All Labs Reviewed:                        15.3   9.24  )-----------( 216      ( 17 Oct 2020 10:25 )             44.7     17 Oct 2020 10:25    145    |  111    |  18     ----------------------------<  84     4.0     |  26     |  1.20     Ca    8.6        17 Oct 2020 10:25    TPro  7.4    /  Alb  3.6    /  TBili  1.0    /  DBili  x      /  AST  18     /  ALT  24     /  AlkPhos  59     17 Oct 2020 10:25    PT/INR - ( 17 Oct 2020 10:25 )   PT: 11.4 sec;   INR: 0.97 ratio         PTT - ( 17 Oct 2020 10:25 )  PTT:26.5 sec  CARDIAC MARKERS ( 17 Oct 2020 10:25 )  <.015 ng/mL / x     / x     / x     / x        Blood Culture:     RADIOLOGY:    EXAM:  CT ANGIO ABD PELV (W)AW IC                          EXAM:  CT ANGIO CHEST (W)AW IC                            PROCEDURE DATE:  10/17/2020          INTERPRETATION:  Reason for Exam:  Chest pain    CTA of the chest, abdomen and pelvis was performed from the thoracic inlet to the level of the pubic symphysis following IV contrast injection of Omnipaque 350. No immediate complications were reported.  3D MIP images were also obtained.    Comparison: Chest x-ray of same date    Findings:    Vascular: There is no dissection flap identified. The aorta is normal in size. The celiac axis, SMA, bilateral renal arteries and VERONICA are patent. The bilateral common iliac arteries and their branches are patent. Atherosclerotic disease noted within the aorta    Chest:  Thyroid gland is unremarkable. Pulmonary arteries are normal in size. No lymphadenopathy. No pericardial effusion. No consolidations, edema, effusion, or pneumothorax.  Right upper lobe 1.5 cm nodule is noted. A left upper lobe 0.4 cm nodule is noted on image 46. Left upper lobe 0.8 cm nodule on image 60 is noted.    Abdomen and Pelvis: The liver, spleen, pancreas and adrenals are unremarkable. The gallbladder is present and contains a gallstone without inflammatory changes. Nonobstructing stone in the right kidney measures 3 mm on series 2 image 128. There is no retroperitoneal lymphadenopathy.    No evidence of bowel obstruction. Colonic diverticulosis without diverticulitis. The appendix is normal. The pelvic organs are unremarkable.    No acute bony abnormality.    Impression: No aortic dissection    Bilateral pulmonary nodules measuring up to 1.5 cm which are indeterminate. Differential includes metastatic disease as well as primary malignancy as well as benigncauses. If there are prior, outside CT exams of the chest they should be submitted for comparison. If none are available consider follow-up in 1 month or alternatively PET/CT examination.    DADA SOW M.D., ATTENDING RADIOLOGIST  This document has been electronically signed. Oct 17 2020 12:16PM    Ventricular Rate 64 BPM    Atrial Rate 64 BPM    P-R Interval 182 ms    QRS Duration 90 ms    Q-T Interval 394 ms    QTC Calculation(Bazett) 406 ms    P Axis 53 degrees    R Axis 20 degrees    T Axis 46 degrees    Diagnosis Line Normal sinus rhythm  Normal ECG  Confirmed by REJI BOWERS ([id number]) on 10/17/2020 12:46:05 PM    EKG:    Assessment/Plan:  69y Male    Duyen Sisi DNP, ANP-C  Cardiology  Spectra #[phone]       [facility] - María Kebede, Ana Cruz, Nestor, Azael, Vern, Ashley  Office Number: [phone]    Initial Consult Note:  This is a 68 y/o M with transient Afib (not on AC) and left inguinal hernia presented to the ED c/o left groin pain which felt to him as if his inguinal hernia mesh "popped" this morning.  His pain was associated with nausea but no vomiting, lightheadedness.  States, his pain was radiating across his abdomen, chest, and back.      CHIEF COMPLAINT: Patient is a 69y old  Male who presents with a chief complaint of     HPI: 68 y/o M with transient Afib (not on AC) and left inguinal hernia presented to the ED c/o left groin pain which felt to him as if his inguinal hernia mesh "popped" this morning.  His pain was associated with nausea but no vomiting, lightheadedness.  States, his pain was radiating across his abdomen, chest, and back.  Denies SOB, KILGORE, palpitations.  Denies fever, chills, sick contact , and syncope.    In the ED, he had a reduction of his left inguinal hernia and he has been asymptomatic.  His EKG showed NSR with no ischemic changes.  His CTA abdomen/pelvis negative of any acute abnormality.  However, CTA chest showed B/L lung nodules.  Troponin in ED negative.    PAST MEDICAL & SURGICAL HISTORY:  Afib    Hernia  inguinal    Hyperlipidemia    H/O hernia repair    SOCIAL HISTORY:  No tobacco, ethanol, or drug abuse.  FAMILY HISTORY:    No family history of acute MI or sudden cardiac death.  MEDICATIONS  (STANDING):    MEDICATIONS  (PRN):    Allergies    penicillin (Hives)    Intolerances    REVIEW OF SYSTEMS:  CONSTITUTIONAL: No weakness, fevers or chills  EYES/ENT: No visual changes;  No vertigo or throat pain   NECK: No pain or stiffness  RESPIRATORY: No cough, wheezing, hemoptysis; No shortness of breath  CARDIOVASCULAR: No chest pain or palpitations, pain radiating from abdomen  GASTROINTESTINAL: + abdominal pain. + nausea, no vomiting, or hematemesis; No diarrhea or constipation. No melena or hematochezia.  left groin pain  GENITOURINARY: No dysuria, frequency or hematuria  NEUROLOGICAL: No numbness or weakness  SKIN: No itching or rash  All other review of systems is negative unless indicated above  VITAL SIGNS:   Vital Signs Last 24 Hrs  T(C): 36.7 (17 Oct 2020 12:22), Max: 36.7 (17 Oct 2020 12:22)  T(F): 98 (17 Oct 2020 12:22), Max: 98 (17 Oct 2020 12:22)  HR: 62 (17 Oct 2020 12:22) (62 - 63)  BP: 142/80 (17 Oct 2020 12:22) (140/78 - 147/83)  BP(mean): --  RR: 16 (17 Oct 2020 12:22) (16 - 18)  SpO2: 99% (17 Oct 2020 12:22) (99% - 100%)  I&O's Summary    On Exam:  Constitutional: NAD, alert and oriented x 3  Lungs:  Non-labored, breath sounds are clear bilaterally, No wheezing, rales or rhonchi  Cardiovascular: RRR.  S1 and S2 positive.  No murmurs, rubs, gallops or clicks  Gastrointestinal: Bowel Sounds present, soft, nontender.  Left inguinal pain s/p reduction in ED  Lymph: No peripheral edema. No cervical lymphadenopathy.  Neurological: Alert, no focal deficits  Skin: No rashes or ulcers   Psych:  Mood & affect appropriate.    LABS: All Labs Reviewed:                        15.3   9.24  )-----------( 216      ( 17 Oct 2020 10:25 )             44.7     17 Oct 2020 10:25    145    |  111    |  18     ----------------------------<  84     4.0     |  26     |  1.20     Ca    8.6        17 Oct 2020 10:25    TPro  7.4    /  Alb  3.6    /  TBili  1.0    /  DBili  x      /  AST  18     /  ALT  24     /  AlkPhos  59     17 Oct 2020 10:25    PT/INR - ( 17 Oct 2020 10:25 )   PT: 11.4 sec;   INR: 0.97 ratio         PTT - ( 17 Oct 2020 10:25 )  PTT:26.5 sec  CARDIAC MARKERS ( 17 Oct 2020 10:25 )  <.015 ng/mL / x     / x     / x     / x        Blood Culture:     RADIOLOGY:    EXAM:  CT ANGIO ABD PELV (W)AW IC                          EXAM:  CT ANGIO CHEST (W)AW IC                            PROCEDURE DATE:  10/17/2020          INTERPRETATION:  Reason for Exam:  Chest pain    CTA of the chest, abdomen and pelvis was performed from the thoracic inlet to the level of the pubic symphysis following IV contrast injection of Omnipaque 350. No immediate complications were reported.  3D MIP images were also obtained.    Comparison: Chest x-ray of same date    Findings:    Vascular: There is no dissection flap identified. The aorta is normal in size. The celiac axis, SMA, bilateral renal arteries and VERONICA are patent. The bilateral common iliac arteries and their branches are patent. Atherosclerotic disease noted within the aorta    Chest:  Thyroid gland is unremarkable. Pulmonary arteries are normal in size. No lymphadenopathy. No pericardial effusion. No consolidations, edema, effusion, or pneumothorax.  Right upper lobe 1.5 cm nodule is noted. A left upper lobe 0.4 cm nodule is noted on image 46. Left upper lobe 0.8 cm nodule on image 60 is noted.    Abdomen and Pelvis: The liver, spleen, pancreas and adrenals are unremarkable. The gallbladder is present and contains a gallstone without inflammatory changes. Nonobstructing stone in the right kidney measures 3 mm on series 2 image 128. There is no retroperitoneal lymphadenopathy.    No evidence of bowel obstruction. Colonic diverticulosis without diverticulitis. The appendix is normal. The pelvic organs are unremarkable.    No acute bony abnormality.    Impression: No aortic dissection    Bilateral pulmonary nodules measuring up to 1.5 cm which are indeterminate. Differential includes metastatic disease as well as primary malignancy as well as benigncauses. If there are prior, outside CT exams of the chest they should be submitted for comparison. If none are available consider follow-up in 1 month or alternatively PET/CT examination.    DADA SOW M.D., ATTENDING RADIOLOGIST  This document has been electronically signed. Oct 17 2020 12:16PM    Ventricular Rate 64 BPM    Atrial Rate 64 BPM    P-R Interval 182 ms    QRS Duration 90 ms    Q-T Interval 394 ms    QTC Calculation(Bazett) 406 ms    P Axis 53 degrees    R Axis 20 degrees    T Axis 46 degrees    Diagnosis Line Normal sinus rhythm  Normal ECG  Confirmed by REJI BOWERS ([id number]) on 10/17/2020 12:46:05 PM    EKG: NSR, no ischemic changes

## 2020-10-17 NOTE — ED PROVIDER NOTE - PROGRESS NOTE DETAILS
pt improved. surgery pa will see pt in ed. cardio consult pending pt seen by cardio dr palmer advised pt can follow up out pt. surgery consult pending. pt seen by surgery dr foster. advised out pt follow up. pt advised on abnormal ct findings and lung nodules advised on need for further testing and pmd follow up. . All imaging and labs reviewed. all results reviewed with pt including abnormal results. pt given a copy of results. pt advised to follow up with pmd regarding abnormal results. All questions answered and concerns addressed. pt verbalized understanding and agreement with plan and dx. pt advised on next step and when/where to follow up. pt advised on all take home and otc medications. pt advised to follow up with PMD. pt advised to return to ed for worsenng symptoms including fever, cp, sob. will dc.

## 2020-10-17 NOTE — ED PROVIDER NOTE - ABDOMINAL EXAM
nontender.../no pulsating masses/soft/tender.../nondistended/multiple well healed scars/no organomegaly

## 2020-10-17 NOTE — ED PROVIDER NOTE - CARE PROVIDERS DIRECT ADDRESSES
[email or web address]

## 2020-10-17 NOTE — ED PROVIDER NOTE - NONTENDER LOCATION
left lower quadrant/umbilical/right costovertebral angle/left upper quadrant/right upper quadrant/right lower quadrant/periumbilical/left costovertebral angle

## 2020-10-17 NOTE — ED PROVIDER NOTE - OBJECTIVE STATEMENT
pt is a 70yo male with pmhx of hld not treated, A-fib on 81mg asa, s/p inguinal hernia repair (aprox 20 years ago) presents with abd pain x 1.5 hours. pt reports diffuse upper abd pain radiating to chest and lower abd with associated nausea without vomiting. pt had normal bms no blood noted. pt reports hernia mesh stretched causing reoccurrence of inguinal hernia. pt reports pain around hernia site. pt has been taking advil the past few days for a toothache. pt denies fever, sob, vomiting, diarrhea, dysuria.  cardio erika  pmd bendit

## 2020-10-17 NOTE — ED ADULT NURSE NOTE - CHPI ED NUR SYMPTOMS NEG
no abdominal distension/no vomiting/no hematuria/no blood in stool/no burning urination/no chills/no fever/no diarrhea/no dysuria

## 2020-10-20 PROBLEM — E78.5 HYPERLIPIDEMIA, UNSPECIFIED: Chronic | Status: ACTIVE | Noted: 2020-10-17

## 2020-10-20 PROBLEM — I48.91 UNSPECIFIED ATRIAL FIBRILLATION: Chronic | Status: ACTIVE | Noted: 2020-10-17

## 2020-10-20 PROBLEM — K46.9 UNSPECIFIED ABDOMINAL HERNIA WITHOUT OBSTRUCTION OR GANGRENE: Chronic | Status: ACTIVE | Noted: 2020-10-17

## 2020-11-04 ENCOUNTER — TRANSCRIPTION ENCOUNTER (OUTPATIENT)
Age: 70
End: 2020-11-04

## 2020-11-04 ENCOUNTER — RESULT REVIEW (OUTPATIENT)
Age: 70
End: 2020-11-04

## 2020-11-04 ENCOUNTER — OUTPATIENT (OUTPATIENT)
Dept: OUTPATIENT SERVICES | Facility: HOSPITAL | Age: 70
LOS: 1 days | End: 2020-11-04
Payer: MEDICARE

## 2020-11-04 ENCOUNTER — APPOINTMENT (OUTPATIENT)
Dept: ULTRASOUND IMAGING | Facility: IMAGING CENTER | Age: 70
End: 2020-11-04
Payer: MEDICARE

## 2020-11-04 DIAGNOSIS — Z98.890 OTHER SPECIFIED POSTPROCEDURAL STATES: Chronic | ICD-10-CM

## 2020-11-04 DIAGNOSIS — Z00.8 ENCOUNTER FOR OTHER GENERAL EXAMINATION: ICD-10-CM

## 2020-11-04 PROCEDURE — 88341 IMHCHEM/IMCYTCHM EA ADD ANTB: CPT

## 2020-11-04 PROCEDURE — 88342 IMHCHEM/IMCYTCHM 1ST ANTB: CPT

## 2020-11-04 PROCEDURE — 10005 FNA BX W/US GDN 1ST LES: CPT

## 2020-11-04 PROCEDURE — 88173 CYTOPATH EVAL FNA REPORT: CPT

## 2020-11-04 PROCEDURE — 88365 INSITU HYBRIDIZATION (FISH): CPT | Mod: 26

## 2020-11-04 PROCEDURE — 88305 TISSUE EXAM BY PATHOLOGIST: CPT | Mod: 26

## 2020-11-04 PROCEDURE — 88172 CYTP DX EVAL FNA 1ST EA SITE: CPT

## 2020-11-04 PROCEDURE — 88305 TISSUE EXAM BY PATHOLOGIST: CPT

## 2020-11-04 PROCEDURE — 88341 IMHCHEM/IMCYTCHM EA ADD ANTB: CPT | Mod: 26,59

## 2020-11-04 PROCEDURE — 88365 INSITU HYBRIDIZATION (FISH): CPT

## 2020-11-04 PROCEDURE — 88173 CYTOPATH EVAL FNA REPORT: CPT | Mod: 26

## 2020-11-04 PROCEDURE — 88342 IMHCHEM/IMCYTCHM 1ST ANTB: CPT | Mod: 26,59

## 2020-11-10 LAB — NON-GYNECOLOGICAL CYTOLOGY STUDY: SIGNIFICANT CHANGE UP

## 2020-12-01 ENCOUNTER — EMERGENCY (EMERGENCY)
Facility: HOSPITAL | Age: 70
LOS: 1 days | Discharge: HOME | End: 2020-12-01
Attending: EMERGENCY MEDICINE | Admitting: EMERGENCY MEDICINE
Payer: MEDICARE

## 2020-12-01 VITALS
SYSTOLIC BLOOD PRESSURE: 145 MMHG | HEART RATE: 86 BPM | TEMPERATURE: 98 F | RESPIRATION RATE: 18 BRPM | OXYGEN SATURATION: 99 % | DIASTOLIC BLOOD PRESSURE: 94 MMHG

## 2020-12-01 VITALS
SYSTOLIC BLOOD PRESSURE: 154 MMHG | RESPIRATION RATE: 16 BRPM | WEIGHT: 171.96 LBS | TEMPERATURE: 98 F | HEIGHT: 71 IN | OXYGEN SATURATION: 97 % | HEART RATE: 91 BPM | DIASTOLIC BLOOD PRESSURE: 90 MMHG

## 2020-12-01 DIAGNOSIS — Z98.890 OTHER SPECIFIED POSTPROCEDURAL STATES: Chronic | ICD-10-CM

## 2020-12-01 LAB
ALBUMIN SERPL ELPH-MCNC: 3.8 G/DL — SIGNIFICANT CHANGE UP (ref 3.3–5)
ALP SERPL-CCNC: 76 U/L — SIGNIFICANT CHANGE UP (ref 40–120)
ALT FLD-CCNC: 22 U/L — SIGNIFICANT CHANGE UP (ref 12–78)
ANION GAP SERPL CALC-SCNC: 7 MMOL/L — SIGNIFICANT CHANGE UP (ref 5–17)
APTT BLD: 32.1 SEC — SIGNIFICANT CHANGE UP (ref 27.5–35.5)
AST SERPL-CCNC: 18 U/L — SIGNIFICANT CHANGE UP (ref 15–37)
BASOPHILS # BLD AUTO: 0.03 K/UL — SIGNIFICANT CHANGE UP (ref 0–0.2)
BASOPHILS NFR BLD AUTO: 0.4 % — SIGNIFICANT CHANGE UP (ref 0–2)
BILIRUB SERPL-MCNC: 1.1 MG/DL — SIGNIFICANT CHANGE UP (ref 0.2–1.2)
BUN SERPL-MCNC: 21 MG/DL — SIGNIFICANT CHANGE UP (ref 7–23)
CALCIUM SERPL-MCNC: 9.1 MG/DL — SIGNIFICANT CHANGE UP (ref 8.5–10.1)
CHLORIDE SERPL-SCNC: 105 MMOL/L — SIGNIFICANT CHANGE UP (ref 96–108)
CO2 SERPL-SCNC: 29 MMOL/L — SIGNIFICANT CHANGE UP (ref 22–31)
CREAT SERPL-MCNC: 1.3 MG/DL — SIGNIFICANT CHANGE UP (ref 0.5–1.3)
EOSINOPHIL # BLD AUTO: 0.11 K/UL — SIGNIFICANT CHANGE UP (ref 0–0.5)
EOSINOPHIL NFR BLD AUTO: 1.3 % — SIGNIFICANT CHANGE UP (ref 0–6)
GLUCOSE SERPL-MCNC: 102 MG/DL — HIGH (ref 70–99)
HCT VFR BLD CALC: 42.7 % — SIGNIFICANT CHANGE UP (ref 39–50)
HGB BLD-MCNC: 14.2 G/DL — SIGNIFICANT CHANGE UP (ref 13–17)
IMM GRANULOCYTES NFR BLD AUTO: 0.4 % — SIGNIFICANT CHANGE UP (ref 0–1.5)
INR BLD: 1.05 RATIO — SIGNIFICANT CHANGE UP (ref 0.88–1.16)
LYMPHOCYTES # BLD AUTO: 1.63 K/UL — SIGNIFICANT CHANGE UP (ref 1–3.3)
LYMPHOCYTES # BLD AUTO: 19.2 % — SIGNIFICANT CHANGE UP (ref 13–44)
MCHC RBC-ENTMCNC: 28.9 PG — SIGNIFICANT CHANGE UP (ref 27–34)
MCHC RBC-ENTMCNC: 33.3 GM/DL — SIGNIFICANT CHANGE UP (ref 32–36)
MCV RBC AUTO: 86.8 FL — SIGNIFICANT CHANGE UP (ref 80–100)
MONOCYTES # BLD AUTO: 0.7 K/UL — SIGNIFICANT CHANGE UP (ref 0–0.9)
MONOCYTES NFR BLD AUTO: 8.2 % — SIGNIFICANT CHANGE UP (ref 2–14)
NEUTROPHILS # BLD AUTO: 6.01 K/UL — SIGNIFICANT CHANGE UP (ref 1.8–7.4)
NEUTROPHILS NFR BLD AUTO: 70.5 % — SIGNIFICANT CHANGE UP (ref 43–77)
NRBC # BLD: 0 /100 WBCS — SIGNIFICANT CHANGE UP (ref 0–0)
PLATELET # BLD AUTO: 262 K/UL — SIGNIFICANT CHANGE UP (ref 150–400)
POTASSIUM SERPL-MCNC: 4 MMOL/L — SIGNIFICANT CHANGE UP (ref 3.5–5.3)
POTASSIUM SERPL-SCNC: 4 MMOL/L — SIGNIFICANT CHANGE UP (ref 3.5–5.3)
PROT SERPL-MCNC: 8 G/DL — SIGNIFICANT CHANGE UP (ref 6–8.3)
PROTHROM AB SERPL-ACNC: 12.3 SEC — SIGNIFICANT CHANGE UP (ref 10.6–13.6)
RBC # BLD: 4.92 M/UL — SIGNIFICANT CHANGE UP (ref 4.2–5.8)
RBC # FLD: 13.1 % — SIGNIFICANT CHANGE UP (ref 10.3–14.5)
SODIUM SERPL-SCNC: 141 MMOL/L — SIGNIFICANT CHANGE UP (ref 135–145)
WBC # BLD: 8.51 K/UL — SIGNIFICANT CHANGE UP (ref 3.8–10.5)
WBC # FLD AUTO: 8.51 K/UL — SIGNIFICANT CHANGE UP (ref 3.8–10.5)

## 2020-12-01 PROCEDURE — 99285 EMERGENCY DEPT VISIT HI MDM: CPT

## 2020-12-01 PROCEDURE — 99284 EMERGENCY DEPT VISIT MOD MDM: CPT | Mod: 25

## 2020-12-01 PROCEDURE — 85610 PROTHROMBIN TIME: CPT

## 2020-12-01 PROCEDURE — 71275 CT ANGIOGRAPHY CHEST: CPT

## 2020-12-01 PROCEDURE — 36415 COLL VENOUS BLD VENIPUNCTURE: CPT

## 2020-12-01 PROCEDURE — 80053 COMPREHEN METABOLIC PANEL: CPT

## 2020-12-01 PROCEDURE — 93005 ELECTROCARDIOGRAM TRACING: CPT

## 2020-12-01 PROCEDURE — 85730 THROMBOPLASTIN TIME PARTIAL: CPT

## 2020-12-01 PROCEDURE — 93010 ELECTROCARDIOGRAM REPORT: CPT

## 2020-12-01 PROCEDURE — 85025 COMPLETE CBC W/AUTO DIFF WBC: CPT

## 2020-12-01 PROCEDURE — 71275 CT ANGIOGRAPHY CHEST: CPT | Mod: 26

## 2020-12-01 PROCEDURE — 96360 HYDRATION IV INFUSION INIT: CPT | Mod: XU

## 2020-12-01 RX ORDER — SODIUM CHLORIDE 9 MG/ML
1000 INJECTION INTRAMUSCULAR; INTRAVENOUS; SUBCUTANEOUS ONCE
Refills: 0 | Status: COMPLETED | OUTPATIENT
Start: 2020-12-01 | End: 2020-12-01

## 2020-12-01 RX ADMIN — SODIUM CHLORIDE 1000 MILLILITER(S): 9 INJECTION INTRAMUSCULAR; INTRAVENOUS; SUBCUTANEOUS at 21:14

## 2020-12-01 RX ADMIN — SODIUM CHLORIDE 1000 MILLILITER(S): 9 INJECTION INTRAMUSCULAR; INTRAVENOUS; SUBCUTANEOUS at 20:14

## 2020-12-01 NOTE — ED PROVIDER NOTE - ATTENDING CONTRIBUTION TO CARE
Pt seen and examined and d/w PA.  agree with a and p.  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 pt had resected. pt had been well and in october had abdominal pain and had ct here and d/c.  pmd referred pt for pet scan which revealed uptake in oropharynx and right lung. 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 pt last week had needle bx of right lung mass and came back + for melanoma. pt awaiting f/u at [place] for xrt and chemo treatment.  pt well until tonight when he coughed a few times, then brought up blood tinged sputum on muliple occasions filling about a coffee cup with blood/saliva mix. pt denies sob, cp, dizziness, throat pain. or any other current sx.  currently on no anticoag. pt on exam in nad, op clear, neck: no stridor, lungs cta, no rales or wheeze.  pt with 2+ pulses, cap refill less than 2 sec.  check labs, cta chest to evaluate for bleeding source.   if + will d/c pulm

## 2020-12-01 NOTE — ED ADULT NURSE NOTE - CHIEF COMPLAINT QUOTE
Pt states " s/p Lung Bx on Wednesday November 25 th @ [facility] , today im cough up blood. Pt denies difficulty breathing/OB."

## 2020-12-01 NOTE — ED PROVIDER NOTE - PROGRESS NOTE DETAILS
labs and CT results discussed. will reach out to pulmonology to discuss results. Dr. Colon will assume care. patient has follow up with IR tomorrow pt with no further coughing in ed. pt feeling better, ct findings reviewed with small area of hemorrhage.  pt wishes d/c as he is seeing IR at [place] at 8:30 am. offered hycodan cough syrup but refusing.  will d/c to fu at [place] in am.   very low risk of severe hemoptysis tonight

## 2020-12-01 NOTE — ED ADULT TRIAGE NOTE - CHIEF COMPLAINT QUOTE
Pt states " s/p Lung Bx on Wednesday November 25 th @ [facility] , today im cough up blood. Pt denies difficulty breathing/OB."

## 2020-12-01 NOTE — ED ADULT NURSE NOTE - CHPI ED NUR SYMPTOMS NEG
no decreased eating/drinking/no weakness/no fever/no nausea/no tingling/no chills/no pain/no dizziness

## 2020-12-01 NOTE — ED PROVIDER NOTE - OBJECTIVE STATEMENT
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 states blood mixed with sputum and "feels like it's coming from lungs". no chest pain, throat pain, or shortness of breath. denies history of similar symptoms. scheduled for biopsy tomorrow on lymph node. also plans to be set up for radiation, chemo, and immunotherapy.   PCP Gilles Cruz  oncologist Dr. Wilks

## 2020-12-01 NOTE — ED PROVIDER NOTE - NSFOLLOWUPINSTRUCTIONS_ED_ALL_ED_FT
return for coughing of andrew blood, shortness of breath.    follow up with IR at [place] in am.  be sure to tell them about episode of coughing blood and show them tonights results.

## 2020-12-01 NOTE — ED ADULT NURSE NOTE - OBJECTIVE STATEMENT
71 y/o M patient PMH lung CA presents to ED from home reporting he is "spitting up blood." Patient reports he had a lung biopsy last wednesday on 11/25/20 and reports he felt well the last week. As per patient he played golf this morning but denies any trauma/exertion. Patient reports he started spitting up blood mixed with mucus 30 minutes prior to arrival to ED. Patient A&Ox4. Patient denies HA, dizziness, SOB, chest pain, abdominal pain, N/V/D. Safety and comfort measures provided and maintained.

## 2020-12-01 NOTE — ED PROVIDER NOTE - ENMT, MLM
Airway patent, Na Mouth with normal mucosa. Throat has no vesicles, no oropharyngeal exudates and uvula is midline.

## 2020-12-01 NOTE — ED PROVIDER NOTE - PATIENT PORTAL LINK FT
You can access the FollowMyHealth Patient Portal offered by [facility] by registering at the following website: [email or web address]. By joining [facility]’s FollowMyHealth portal, you will also be able to view your health information using other applications (apps) compatible with our system.

## 2020-12-01 NOTE — ED PROVIDER NOTE - CLINICAL SUMMARY MEDICAL DECISION MAKING FREE TEXT BOX
hemoptysis PTA. no chest pain or shortness of breath. history of recent biopsy and history of cancer. differential include but not limited to PE, bleeding from biopsy site, bronchitis. will check labs, CTA chest hemoptysis PTA. no chest pain or shortness of breath. history of recent biopsy and history of cancer. differential include but not limited to PE, bleeding from biopsy site, bronchitis. will check labs, CTA chest---d/c [facility] in am

## 2020-12-02 ENCOUNTER — APPOINTMENT (OUTPATIENT)
Dept: SURGERY | Facility: CLINIC | Age: 70
End: 2020-12-02

## 2022-02-08 NOTE — ED PROVIDER NOTE - NSDCPRINTRESULTS_ED_ALL_ED
4 = No assist / stand by assistance
Patient requests all Lab and Radiology Results on their Discharge Instructions

## 2022-03-22 NOTE — ED ADULT NURSE NOTE - NSHOSCREENINGQ1_ED_ALL_ED
Nursing:  Due for cln/egd 2/2023.   Dx:[id number]  Please place recall    [facility] No

## 2023-12-02 NOTE — ED ADULT NURSE NOTE - NSFALLRSKASSESASSIST_ED_ALL_ED
73-year-old male with history of end-stage renal disease on dialysis brought by ambulance from [facility] for evaluation of injuries after a fall today.  Patient states he is weak due to chronic illness and falls a lot.  Complaining of injury to head and left elbow.  Denies other injury or symptom.  His PCP is Dr. White    Physical exam vital signs stable afebrile no distress.  Head exam atraumatic scalp nontender nondeformed.  Pupils equal round reactive to light extraocular muscles intact.  No Porter's or raccoon sign.  Neck is supple full range of motion intact nontender.  Heart and lungs normal.  Abdomen soft nontender.  Extremity exam there is an abrasion on the left elbow.  But full range of motion pulses and sensation intact.  There is no bony deformity.  Legs are atraumatic.  There is no spinal tenderness or deformity.  Neuro awake and alert.  Mildly confused.  Generalized weakness but no focal deficits.  Skin warm and dry no rash.  Abrasion left elbow as discussed.  Impression is fall with head injury and left elbow abrasion.  Plan is CT/x-ray wound care and head injury instructions.
no